# Patient Record
Sex: FEMALE | URBAN - METROPOLITAN AREA
[De-identification: names, ages, dates, MRNs, and addresses within clinical notes are randomized per-mention and may not be internally consistent; named-entity substitution may affect disease eponyms.]

---

## 2020-01-29 ENCOUNTER — APPOINTMENT (OUTPATIENT)
Dept: URBAN - METROPOLITAN AREA CLINIC 237 | Age: 75
Setting detail: DERMATOLOGY
End: 2020-01-29

## 2020-01-29 DIAGNOSIS — H02.84 EDEMA OF EYELID: ICD-10-CM

## 2020-01-29 DIAGNOSIS — L97 NON-PRESSURE CHRONIC ULCER OF LOWER LIMB, NOT ELSEWHERE CLASSIFIED: ICD-10-CM

## 2020-01-29 DIAGNOSIS — L60.3 NAIL DYSTROPHY: ICD-10-CM

## 2020-01-29 PROBLEM — L97.819 NON-PRESSURE CHRONIC ULCER OF OTHER PART OF RIGHT LOWER LEG WITH UNSPECIFIED SEVERITY: Status: ACTIVE | Noted: 2020-01-29

## 2020-01-29 PROBLEM — H02.849 EDEMA OF UNSPECIFIED EYE, UNSPECIFIED EYELID: Status: ACTIVE | Noted: 2020-01-29

## 2020-01-29 PROCEDURE — OTHER PATIENT SPECIFIC COUNSELING: OTHER

## 2020-01-29 PROCEDURE — OTHER TREATMENT REGIMEN: OTHER

## 2020-01-29 PROCEDURE — OTHER ADDITIONAL NOTES: OTHER

## 2020-01-29 PROCEDURE — OTHER PHOTO-DOCUMENTATION: OTHER

## 2020-01-29 PROCEDURE — 99202 OFFICE O/P NEW SF 15 MIN: CPT

## 2020-01-29 PROCEDURE — OTHER DIAGNOSIS COMMENT: OTHER

## 2020-01-29 PROCEDURE — OTHER PRESCRIPTION: OTHER

## 2020-01-29 RX ORDER — MUPIROCIN 20 MG/G
SM AMT OINTMENT TOPICAL BID-TID
Qty: 1 | Refills: 0 | Status: ERX | COMMUNITY
Start: 2020-01-29

## 2020-01-29 RX ORDER — TRIAMCINOLONE ACETONIDE 1 MG/G
SM AMT OINTMENT TOPICAL BID
Qty: 1 | Refills: 0 | Status: ERX | COMMUNITY
Start: 2020-01-29

## 2020-01-29 ASSESSMENT — LOCATION ZONE DERM
LOCATION ZONE: FACE
LOCATION ZONE: LEG
LOCATION ZONE: FINGERNAIL

## 2020-01-29 ASSESSMENT — LOCATION SIMPLE DESCRIPTION DERM
LOCATION SIMPLE: RIGHT CHEEK
LOCATION SIMPLE: RIGHT PRETIBIAL REGION
LOCATION SIMPLE: RIGHT INDEX FINGERNAIL
LOCATION SIMPLE: LEFT INDEX FINGERNAIL

## 2020-01-29 ASSESSMENT — LOCATION DETAILED DESCRIPTION DERM
LOCATION DETAILED: RIGHT PROXIMAL PRETIBIAL REGION
LOCATION DETAILED: RIGHT INDEX FINGERNAIL
LOCATION DETAILED: LEFT INDEX FINGERNAIL
LOCATION DETAILED: RIGHT CENTRAL MALAR CHEEK

## 2020-01-29 NOTE — PROCEDURE: PATIENT SPECIFIC COUNSELING
MD concerned for arterial ulcer given that legs are cold and pulses are weak. Patient unsure if she has have had imaging to r/o arterial occlusion. Ulceration has already started healing, however thick eschar impedes exam. MD attempted to manually debride ulcer, unable to remove scale completely. Patient instructed to begin daily H2O2 soaks and apply mupirocin ointment BID-TID to help lift scale. \\n\\nMD advised pt to get an appt with cardiologist (Dr. Gino Rodrigez) ASAP for eval and further testing given lower extremity duskiness and sudden onset fluid under eyes - see impression #2.
MD concerned about sudden onset fluid collection under eyes particularly given cardiac history. Advised patient to f/u with cardiology to discuss overall fluid status.
Detail Level: Zone

## 2020-01-29 NOTE — PROCEDURE: ADDITIONAL NOTES
Additional Notes: Nails dystrophic at distal tips; suspect secondary to patient forcefully removing acrylic nails. will continue to monitor.
Detail Level: Simple
